# Patient Record
Sex: MALE | NOT HISPANIC OR LATINO | URBAN - METROPOLITAN AREA
[De-identification: names, ages, dates, MRNs, and addresses within clinical notes are randomized per-mention and may not be internally consistent; named-entity substitution may affect disease eponyms.]

---

## 2017-04-24 ENCOUNTER — IMPORTED ENCOUNTER (OUTPATIENT)
Dept: URBAN - METROPOLITAN AREA CLINIC 1 | Facility: CLINIC | Age: 62
End: 2017-04-24

## 2017-04-24 PROBLEM — Z79.84: Noted: 2017-04-24

## 2017-04-24 PROBLEM — E11.9: Noted: 2017-04-24

## 2017-04-24 PROBLEM — H40.023: Noted: 2017-04-24

## 2017-04-24 PROBLEM — D31.32: Noted: 2017-04-24

## 2017-04-24 PROBLEM — H26.493: Noted: 2017-04-24

## 2017-04-24 PROBLEM — H01.005: Noted: 2017-04-24

## 2017-04-24 PROBLEM — H01.002: Noted: 2017-04-24

## 2017-04-24 PROBLEM — Z96.1: Noted: 2017-04-24

## 2017-04-24 PROBLEM — H43.811: Noted: 2017-04-24

## 2017-04-24 PROCEDURE — 92015 DETERMINE REFRACTIVE STATE: CPT

## 2017-04-24 PROCEDURE — 92012 INTRM OPH EXAM EST PATIENT: CPT

## 2017-04-24 NOTE — PATIENT DISCUSSION
1.  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 Risks and benefits discussed with pt and pt desires to schedule yag cap. 2. DM Type II without DR OU- Stable. Discussed the pathophysiology of diabetes and its effect on the eye and risk of blindness. Stressed the importance of strong glucose control. Advised of importance of at least yearly dilated examinations but to contact us immediately for any problems or concerns. 3. Type II diabetes controlled by oral medications. 4.  Choroidal Nevus OS- Appears Benign and stable. Observe for changes. 5. PVD w/o Tear OD- Condition discussed w/ pt. RD precautions. 6.  Blepharitis posterior type OU- Continue daily warm compresses and lid scrubs were recommended. 7. Glaucoma Suspect OU (0.8 OU/ () FHX): Stable IOP and cupping OU. Past w/u (-). Patient is considered high risk. 8.  Pseudophakia OU (TMF OU)- Doig well. 9.  Return for an appointment for Yag Cap OD then OS in 1-6 weeks with Dr. Yoli Pennington.

## 2017-06-02 ENCOUNTER — IMPORTED ENCOUNTER (OUTPATIENT)
Dept: URBAN - METROPOLITAN AREA CLINIC 1 | Facility: CLINIC | Age: 62
End: 2017-06-02

## 2017-06-02 PROBLEM — H26.491: Noted: 2017-06-02

## 2017-06-02 PROCEDURE — 66821 AFTER CATARACT LASER SURGERY: CPT

## 2017-06-02 NOTE — PATIENT DISCUSSION
YAG CAP OD: (Consent signed and scanned into attachments) 1 gtt Prolensa applied. The purpose and nature of the procedure possible alternative methods of treatment the risks involved and the possibility of complications were discussed with patient. The Patient wishes to proceed and the consent was signed. The laser was then performed under topical anesthesia with no complications. Post op instructions were given to patient as well as a follow-up appointment. Patient was advised to call our office if any questions or concerns.  RTC as scheduled 2nd eye

## 2017-06-16 ENCOUNTER — IMPORTED ENCOUNTER (OUTPATIENT)
Dept: URBAN - METROPOLITAN AREA CLINIC 1 | Facility: CLINIC | Age: 62
End: 2017-06-16

## 2017-06-16 PROBLEM — H26.492: Noted: 2017-06-16

## 2017-06-16 PROCEDURE — 66821 AFTER CATARACT LASER SURGERY: CPT

## 2017-06-16 NOTE — PATIENT DISCUSSION
YAG CAP OS: (Consent signed and scanned into attachments) 1 gtt Prolensa applied. The purpose and nature of the procedure possible alternative methods of treatment the risks involved and the possibility of complications were discussed with patient. The Patient wishes to proceed and the consent was signed. The laser was then performed under topical anesthesia with no complications. Post op instructions were given to patient as well as a follow-up appointment. Patient was advised to call our office if any questions or concerns. Increase tears OU to QID routinely. Return for an appointment for PO Yag in 1-6 weeks with Dr. Layne Quiroz.

## 2017-07-03 ENCOUNTER — IMPORTED ENCOUNTER (OUTPATIENT)
Dept: URBAN - METROPOLITAN AREA CLINIC 1 | Facility: CLINIC | Age: 62
End: 2017-07-03

## 2017-07-03 PROCEDURE — 99024 POSTOP FOLLOW-UP VISIT: CPT

## 2017-07-03 NOTE — PATIENT DISCUSSION
1. PO YAG Cap OU: good result. VA excellent Return for an appointment in 6 mo 30 OCT with Dr. Lissy Young.

## 2018-01-09 ENCOUNTER — IMPORTED ENCOUNTER (OUTPATIENT)
Dept: URBAN - METROPOLITAN AREA CLINIC 1 | Facility: CLINIC | Age: 63
End: 2018-01-09

## 2018-01-09 PROBLEM — H40.023: Noted: 2018-01-09

## 2018-01-09 PROBLEM — H43.813: Noted: 2018-01-09

## 2018-01-09 PROBLEM — D31.32: Noted: 2018-01-09

## 2018-01-09 PROBLEM — R73.09: Noted: 2018-01-09

## 2018-01-09 PROCEDURE — 92014 COMPRE OPH EXAM EST PT 1/>: CPT

## 2018-01-09 PROCEDURE — 92133 CPTRZD OPH DX IMG PST SGM ON: CPT

## 2018-01-09 NOTE — PATIENT DISCUSSION
1.  Glaucoma Suspect OU : Patient is considered high risk. Condition was discussed with patient and patient understands. Will continue to monitor patient for any progression in condition. Patient was advised to call us with any problems questions or concerns. 2.  DM Type II (Diet Controlled) without sign of diabetic retinopathy and no blot heme on dilated retinal examination today OU No Macular Edema:  Discussed the pathophysiology of diabetes and its effect on the eye and risk of blindness. Stressed the importance of strong glucose control. Advised of importance of at least yearly dilated examinations but to contact us immediately for any problems or concerns. 3. Choroidal Nevus OS: Appears Benign and stable. Observe for changes. 4. PVD OU - RD precautions. 5. Blepharitis- consider Hydrochlor spray. 5. Return for an appointment in 1 year for 30 VF with Dr. Kimberly Tubbs.

## 2019-01-10 ENCOUNTER — IMPORTED ENCOUNTER (OUTPATIENT)
Dept: URBAN - METROPOLITAN AREA CLINIC 1 | Facility: CLINIC | Age: 64
End: 2019-01-10

## 2019-01-10 PROBLEM — H04.123: Noted: 2019-01-10

## 2019-01-10 PROBLEM — H40.013: Noted: 2019-01-10

## 2019-01-10 PROBLEM — D31.32: Noted: 2019-01-10

## 2019-01-10 PROBLEM — H01.005: Noted: 2019-01-10

## 2019-01-10 PROBLEM — H43.813: Noted: 2019-01-10

## 2019-01-10 PROBLEM — Z96.1: Noted: 2019-01-10

## 2019-01-10 PROBLEM — E11.9: Noted: 2019-01-10

## 2019-01-10 PROBLEM — H01.002: Noted: 2019-01-10

## 2019-01-10 PROBLEM — H16.143: Noted: 2019-01-10

## 2019-01-10 PROCEDURE — 92015 DETERMINE REFRACTIVE STATE: CPT

## 2019-01-10 PROCEDURE — 92083 EXTENDED VISUAL FIELD XM: CPT

## 2019-01-10 PROCEDURE — 92014 COMPRE OPH EXAM EST PT 1/>: CPT

## 2019-01-10 NOTE — PATIENT DISCUSSION
1.  DM Type II without sign of diabetic retinopathy and no blot heme on dilated retinal examination today OU No Macular Edema: Diet controlled. Stable. Discussed the pathophysiology of diabetes and its effect on the eye and risk of blindness. Stressed the importance of strong glucose control. Advised of importance of at least yearly dilated examinations but to contact us immediately for any problems or concerns. 2. PASCALE w/ PEK OU- Increased symptoms. The increase of artificial tears OU TID were recommended. 3.  Blepharitis anterior type OU- Continue daily hot compresses and lid scrubs were recommended. 4. Choroidal Nevus OS: Appears Benign and stable. Observe for changes. 5. PVD OU- Old stable. RD precautions. 6.  Glaucoma Suspect OU (0.8 OU): Stable IOP and C/D OU. Essentially normal HVF OU. Patient is considered Low Risk. Condition was discussed with patient and patient understands. Will continue to monitor patient for any progression in condition. Patient was advised to call us with any problems questions or concerns. 7.  Pseudophakia OU- TMFs OU- H/o Yag OU. 8. Return for an appointment for 30/OCT in 1 year with Dr. Loretta Apodaca.

## 2019-08-05 NOTE — PATIENT DISCUSSION
OS 1st/ Symfony OS Catherine/ Custom OS Catherine/ B+ OS Catherine/ B OS Catherine/ TMF  Catherine/ Custom OD Catherine/ B+ OD Catherine/ B OD Catherine/ i-stent OU/+VAN.

## 2019-08-05 NOTE — PATIENT DISCUSSION
**Pt may need a note from [facility] for refund on airline tickets to [place]. Calling airline to move dates 1st.

## 2019-08-15 NOTE — PATIENT DISCUSSION
**Pt may need a note from [facility] for refund on airline tickets to [place]. Calling airline to move dates 1st.

## 2019-08-15 NOTE — PATIENT DISCUSSION
**Pt may need a note from [facility] for refund on airline tickets to [place]. Calling airline to move dates 1st.

## 2019-08-16 NOTE — PATIENT DISCUSSION
**Pt may need a note from [facility] for refund on airline tickets to [place]. Calling airline to move dates 1st.

## 2019-08-19 NOTE — PATIENT DISCUSSION
**Pt may need a note from [facility] [address] for refund on airline tickets to [place]. Calling airline to move dates 1st.

## 2019-08-19 NOTE — PATIENT DISCUSSION
Cataract surgery has been performed in the first eye and activities of daily living are still impaired. The patient would like to proceed with cataract surgery in the second eye as scheduled. The patient elects TMF(+3.25) IOL OD, goal of emmetropia.

## 2019-08-22 NOTE — PATIENT DISCUSSION
1 wk PO: Patient is doing well post-operatively. The importance of post-op drop compliance was emphasized. Drop schedule reviewed with patient. Patient to call if any visual changes or concerns.

## 2019-08-22 NOTE — PATIENT DISCUSSION
**Pt may need a note from [facility] for refund on airline tickets to [place]. Calling airline to move dates 1st.

## 2019-08-22 NOTE — PATIENT DISCUSSION
OS 1st/ Symfony OS Catherine/ Custom OS Catherine/ B+ OS Catherine/ B OS Catherine/ TMF  Catherine/ Custom OD Catherine/ B+ OD Catherine/ B OD Actherine/ i-stent OU/+VAN.

## 2019-08-22 NOTE — PATIENT DISCUSSION
**Pt may need a note from [facility] for refund on airline tickets to [place]. Calling airline to move dates 1st.

## 2019-08-22 NOTE — PATIENT DISCUSSION
**Pt may need a note from [facility] for refund on airline tickets to [place]. Calling airline to move dates 1st.

## 2019-08-23 NOTE — PATIENT DISCUSSION
**Pt may need a note from [facility] for refund on airline tickets to [place]. Calling airline to move dates 1st.

## 2019-12-04 NOTE — PROCEDURE NOTE: SURGICAL
Prior to commencing surgery, Dr. Ema Westbrook confirmed patient identification, surgical procedure, site and side. Following topical proparacaine anesthesia, the patient was positioned at the YAG laser, a contact lens was coupled to the cornea of the right eye with methylcellulose and an axial posterior capsulotomy was performed without complication using 2.7 Mj x 21. Attention was turned to the left eye and a contact lens was coupled to the cornea of the left eye with methylcellulose and an axial posterior capsulotomy was performed without complication using 2.8 Mj x 19. Excess methylcellulose was washed from both eyes, one drop of Alphagan was instilled in each eye and the patient returned to the holding area having tolerated the procedure well and without complication. <br />[facility] [id number]<SY /><br />

## 2019-12-27 NOTE — PROCEDURE NOTE: CLINICAL
PROCEDURE NOTE: Punctal Plugs, Silicone .7 /.7 #2 Bilateral Lower Lids. Diagnosis: Dry Eye Syndrome. Anesthesia: Topical. Prep: Antibiotic Drops q 5min x 3. Prior to treatment, the risks/benefits/alternatives were discussed. The patient wished to proceed with procedure. Permanent silicone plugs were inserted. Patient tolerated procedure well. There were no complications. Post procedure instructions given. Size .7 /.7. Nikos Mckeon

## 2020-01-10 ENCOUNTER — IMPORTED ENCOUNTER (OUTPATIENT)
Dept: URBAN - METROPOLITAN AREA CLINIC 1 | Facility: CLINIC | Age: 65
End: 2020-01-10

## 2020-01-10 PROBLEM — D31.32: Noted: 2020-01-10

## 2020-01-10 PROBLEM — Z96.1: Noted: 2020-01-10

## 2020-01-10 PROBLEM — H04.123: Noted: 2020-01-10

## 2020-01-10 PROBLEM — H40.013: Noted: 2020-01-10

## 2020-01-10 PROBLEM — H01.004: Noted: 2020-01-10

## 2020-01-10 PROBLEM — H16.143: Noted: 2020-01-10

## 2020-01-10 PROBLEM — H01.001: Noted: 2020-01-10

## 2020-01-10 PROBLEM — H43.813: Noted: 2020-01-10

## 2020-01-10 PROBLEM — R73.09: Noted: 2020-01-10

## 2020-01-10 PROCEDURE — 92015 DETERMINE REFRACTIVE STATE: CPT

## 2020-01-10 PROCEDURE — 92133 CPTRZD OPH DX IMG PST SGM ON: CPT

## 2020-01-10 PROCEDURE — 92014 COMPRE OPH EXAM EST PT 1/>: CPT

## 2020-01-10 NOTE — PATIENT DISCUSSION
1.  DM Type II (Diet Controlled) without sign of diabetic retinopathy and no blot heme on dilated retinal examination today OU No Macular Edema:  Discussed the pathophysiology of diabetes and its effect on the eye and risk of blindness. Stressed the importance of strong glucose control. Advised of importance of at least yearly dilated examinations but to contact us immediately for any problems or concerns. 2. Glaucoma Suspect OU (CD 0.80 OU): OCT WNL OD minimal RNFL thinning shown OS. IOP stable. Family hx. Patient is considered Low Risk. Condition was discussed with patient and patient understands. Will continue to monitor patient for any progression in condition. Patient was advised to call us with any problems questions or concerns. 3.  PASCALE w/ PEK OU- Recommend ATs TID OU routinely 4. Pseudophakia OU - (TMF ZKB00 OU) H/o YAG Cap OU 5. Anterior Blepharitis OU - Daily Hot compresses and lid scrubs were recommended. 6. Choroidal Nevus OS: Appears Benign and stable. Observe for changes. 7. PVD OU - RD precautions. Return for an appointment in 1 year 30/OCT with Dr. José Lorenzo.

## 2021-01-12 ENCOUNTER — IMPORTED ENCOUNTER (OUTPATIENT)
Dept: URBAN - METROPOLITAN AREA CLINIC 1 | Facility: CLINIC | Age: 66
End: 2021-01-12

## 2021-01-12 PROBLEM — H43.813: Noted: 2021-01-12

## 2021-01-12 PROBLEM — H01.001: Noted: 2021-01-12

## 2021-01-12 PROBLEM — H04.123: Noted: 2021-01-12

## 2021-01-12 PROBLEM — H40.023: Noted: 2021-01-12

## 2021-01-12 PROBLEM — R73.09: Noted: 2021-01-12

## 2021-01-12 PROBLEM — H16.143: Noted: 2021-01-12

## 2021-01-12 PROBLEM — H01.004: Noted: 2021-01-12

## 2021-01-12 PROBLEM — Z96.1: Noted: 2021-01-12

## 2021-01-12 PROBLEM — D31.32: Noted: 2021-01-12

## 2021-01-12 PROCEDURE — 92133 CPTRZD OPH DX IMG PST SGM ON: CPT

## 2021-01-12 PROCEDURE — 92014 COMPRE OPH EXAM EST PT 1/>: CPT

## 2021-01-12 NOTE — PATIENT DISCUSSION
1.  DM Type II (Diet Controlled) without sign of diabetic retinopathy and no blot heme on dilated retinal examination today OU No Macular Edema:  Discussed the pathophysiology of diabetes and its effect on the eye and risk of blindness. Stressed the importance of strong glucose control. Advised of importance of at least yearly dilated examinations but to contact us immediately for any problems or concerns. 2. Glaucoma Suspect OU (CD 0.80 OU): OCT essentially WNL OU. IOP stable. Family hx. Patient is considered high risk. Condition was discussed with patient and patient understands. Will continue to monitor patient for any progression in condition. Patient was advised to call us with any problems questions or concerns. 3.  PASCALE w/ PEK OU- Recommend ATs TID OU routinely 4. Pseudophakia OU - (TMF ZKB00 OU) H/o YAG Cap OU 5. Anterior Blepharitis OU - Daily Hot compresses and lid scrubs were recommended. 6. Choroidal Nevus OS: Appears Benign and stable. Observe for changes. 7. PVD OU - RD precautions. 8.  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 Return for an appointment in 1 year 30/OCT with Dr. Anoop Pruitt.

## 2022-01-18 ENCOUNTER — IMPORTED ENCOUNTER (OUTPATIENT)
Dept: URBAN - METROPOLITAN AREA CLINIC 1 | Facility: CLINIC | Age: 67
End: 2022-01-18

## 2022-01-18 PROBLEM — H40.023: Noted: 2022-01-18

## 2022-01-18 PROBLEM — H16.143: Noted: 2022-01-18

## 2022-01-18 PROBLEM — H43.813: Noted: 2022-01-18

## 2022-01-18 PROBLEM — H04.123: Noted: 2022-01-18

## 2022-01-18 PROBLEM — H01.024: Noted: 2022-01-18

## 2022-01-18 PROBLEM — Z96.1: Noted: 2022-01-18

## 2022-01-18 PROBLEM — R73.09: Noted: 2022-01-18

## 2022-01-18 PROBLEM — D31.32: Noted: 2022-01-18

## 2022-01-18 PROBLEM — H01.021: Noted: 2022-01-18

## 2022-01-18 PROBLEM — H01.004: Noted: 2022-01-18

## 2022-01-18 PROBLEM — H01.001: Noted: 2022-01-18

## 2022-01-18 PROCEDURE — 92014 COMPRE OPH EXAM EST PT 1/>: CPT

## 2022-01-18 PROCEDURE — 92133 CPTRZD OPH DX IMG PST SGM ON: CPT

## 2022-01-18 NOTE — PATIENT DISCUSSION
1.  DM Type II (Diet Controlled) without sign of diabetic retinopathy and no blot heme on dilated retinal examination today OU No Macular Edema:  Discussed the pathophysiology of diabetes and its effect on the eye and risk of blindness. Stressed the importance of strong glucose control. Advised of importance of at least yearly dilated examinations but to contact us immediately for any problems or concerns. 2. Glaucoma Suspect OU (CD 0.80 OU) - OCT essentially WNL OU. IOP stable. Family hx. Patient is considered high risk. Condition was discussed with patient and patient understands. Will continue to monitor patient for any progression in condition. Patient was advised to call us with any problems questions or concerns. 3.  PASCALE w/ PEK OU - Recommend the increase of ATs QID OU routinely and switch PF. 4.  Pseudophakia OU - (TMF ZKB00 OU) H/o YAG Cap OU 5. Anterior Blepharitis OU - Daily Hot compresses and lid scrubs were recommended. 6. Choroidal Nevus OS - Appears Benign and stable. Observe for changes. 7. PVD OU - old/stable. 8. 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 Letter to PCP. Return for an appointment in 1 year 30/HVF with Dr. Juliano Rae.

## 2022-01-26 NOTE — PROCEDURE NOTE: CLINICAL
PROCEDURE NOTE: Punctal Plugs, Aldo Trinh ([id number], [id number]) #2 Bilateral Lower Lids. Diagnosis: Dry Eye Syndrome. Prior to treatment, the risks/benefits/alternatives were discussed. The patient wished to proceed with procedure. Temporary collagen plugs were inserted. Patient tolerated procedure well. There were no complications. Post procedure instructions given. Raghavendra Dawkins

## 2022-04-08 ASSESSMENT — VISUAL ACUITY
OD_SC: J1
OD_SC: J1
OS_SC: J1+
OS_CC: 20/20
OS_CC: 20/20
OD_GLARE: 20/50
OD_SC: J1+
OD_CC: 20/20
OD_SC: J1+
OD_SC: J1
OS_CC: 20/20
OS_CC: 20/20
OD_CC: 20/20
OD_CC: 20/20
OS_GLARE: 20/50
OD_SC: J1
OD_CC: 20/20
OD_SC: J1+
OS_CC: 20/20
OS_SC: J1
OS_SC: J1+
OS_SC: J1
OS_SC: J1
OD_CC: 20/20
OS_CC: 20/20
OS_CC: 20/20
OS_SC: J1
OS_SC: J1+
OS_CC: 20/20
OD_CC: 20/20

## 2022-04-08 ASSESSMENT — TONOMETRY
OD_IOP_MMHG: 18
OS_IOP_MMHG: 15
OD_IOP_MMHG: 15
OD_IOP_MMHG: 17
OD_IOP_MMHG: 14
OD_IOP_MMHG: 17
OS_IOP_MMHG: 19
OS_IOP_MMHG: 17
OS_IOP_MMHG: 17
OS_IOP_MMHG: 16
OS_IOP_MMHG: 17
OS_IOP_MMHG: 15
OD_IOP_MMHG: 15
OD_IOP_MMHG: 19

## 2022-10-18 NOTE — PATIENT DISCUSSION
Patient is doing well post-operatively. The importance of post-op drop compliance was emphasized. Drop schedule reviewed with patient. Patient to call if any visual changes or concerns. show

## 2023-01-17 ENCOUNTER — COMPREHENSIVE EXAM (OUTPATIENT)
Dept: URBAN - METROPOLITAN AREA CLINIC 1 | Facility: CLINIC | Age: 68
End: 2023-01-17

## 2023-01-17 DIAGNOSIS — D31.32: ICD-10-CM

## 2023-01-17 DIAGNOSIS — H35.3121: ICD-10-CM

## 2023-01-17 DIAGNOSIS — Z96.1: ICD-10-CM

## 2023-01-17 DIAGNOSIS — H16.143: ICD-10-CM

## 2023-01-17 DIAGNOSIS — H43.813: ICD-10-CM

## 2023-01-17 DIAGNOSIS — H04.123: ICD-10-CM

## 2023-01-17 DIAGNOSIS — E11.9: ICD-10-CM

## 2023-01-17 DIAGNOSIS — H40.023: ICD-10-CM

## 2023-01-17 PROCEDURE — 92083 EXTENDED VISUAL FIELD XM: CPT

## 2023-01-17 PROCEDURE — 99214 OFFICE O/P EST MOD 30 MIN: CPT

## 2023-01-17 ASSESSMENT — VISUAL ACUITY
OD_SC: 20/20
OS_SC: 20/20
OS_SC: J2
OD_SC: J1

## 2023-01-17 ASSESSMENT — TONOMETRY
OS_IOP_MMHG: 16
OD_IOP_MMHG: 15

## 2023-01-17 NOTE — PATIENT DISCUSSION
(*VERY EARLY) Importance of daily AREDS II study multivitamin and Amsler Grid checks discussed with patient. Patient to follow-up immediately with any new onset of decreased vision and/or metamorphopsia.

## 2023-01-17 NOTE — PATIENT DISCUSSION
(CD: 0.8) IOP stable. HVF today WNL with no progression. Patient is considered high risk. Condition was discussed with patient and patient understands. Will continue to monitor patient for any progression in condition. Patient was advised to call us with any problems, questions, or concerns.

## 2023-01-17 NOTE — PATIENT DISCUSSION
Recommend change to PF ATs QID OU, routinely. Consider Restasis/Xiidra without improvement. Recommend Gel (refresh Celuvisc) QHS OU.

## 2024-01-29 ENCOUNTER — FOLLOW UP (OUTPATIENT)
Dept: URBAN - METROPOLITAN AREA CLINIC 1 | Facility: CLINIC | Age: 69
End: 2024-01-29

## 2024-01-29 DIAGNOSIS — H16.143: ICD-10-CM

## 2024-01-29 DIAGNOSIS — H04.123: ICD-10-CM

## 2024-01-29 PROCEDURE — 99213 OFFICE O/P EST LOW 20 MIN: CPT

## 2024-01-29 ASSESSMENT — KERATOMETRY
OS_K2POWER_DIOPTERS: 42.75
OS_K1POWER_DIOPTERS: 42.50
OD_K2POWER_DIOPTERS: 42.50
OD_K1POWER_DIOPTERS: 42.00

## 2024-01-29 ASSESSMENT — TONOMETRY
OD_IOP_MMHG: 14
OS_IOP_MMHG: 14

## 2024-01-29 ASSESSMENT — VISUAL ACUITY
OD_SC: 20/20
OS_SC: 20/20

## 2025-01-16 ENCOUNTER — COMPREHENSIVE EXAM (OUTPATIENT)
Age: 70
End: 2025-01-16

## 2025-01-16 DIAGNOSIS — H35.3121: ICD-10-CM

## 2025-01-16 DIAGNOSIS — D31.32: ICD-10-CM

## 2025-01-16 DIAGNOSIS — H16.143: ICD-10-CM

## 2025-01-16 DIAGNOSIS — H01.021: ICD-10-CM

## 2025-01-16 DIAGNOSIS — H01.024: ICD-10-CM

## 2025-01-16 DIAGNOSIS — E11.9: ICD-10-CM

## 2025-01-16 DIAGNOSIS — H43.813: ICD-10-CM

## 2025-01-16 DIAGNOSIS — H40.013: ICD-10-CM

## 2025-01-16 DIAGNOSIS — Z96.1: ICD-10-CM

## 2025-01-16 DIAGNOSIS — H04.123: ICD-10-CM

## 2025-01-16 PROCEDURE — 99214 OFFICE O/P EST MOD 30 MIN: CPT

## 2025-01-16 PROCEDURE — 92133 CPTRZD OPH DX IMG PST SGM ON: CPT
